# Patient Record
Sex: MALE | Race: OTHER | NOT HISPANIC OR LATINO | URBAN - METROPOLITAN AREA
[De-identification: names, ages, dates, MRNs, and addresses within clinical notes are randomized per-mention and may not be internally consistent; named-entity substitution may affect disease eponyms.]

---

## 2021-11-02 ENCOUNTER — EMERGENCY (EMERGENCY)
Facility: HOSPITAL | Age: 21
LOS: 1 days | Discharge: HOME | End: 2021-11-02
Attending: STUDENT IN AN ORGANIZED HEALTH CARE EDUCATION/TRAINING PROGRAM | Admitting: STUDENT IN AN ORGANIZED HEALTH CARE EDUCATION/TRAINING PROGRAM
Payer: OTHER MISCELLANEOUS

## 2021-11-02 VITALS
OXYGEN SATURATION: 99 % | DIASTOLIC BLOOD PRESSURE: 68 MMHG | SYSTOLIC BLOOD PRESSURE: 161 MMHG | HEART RATE: 62 BPM | RESPIRATION RATE: 16 BRPM | TEMPERATURE: 99 F

## 2021-11-02 PROCEDURE — 73130 X-RAY EXAM OF HAND: CPT | Mod: 26,RT

## 2021-11-02 PROCEDURE — 99284 EMERGENCY DEPT VISIT MOD MDM: CPT

## 2021-11-02 PROCEDURE — 73110 X-RAY EXAM OF WRIST: CPT | Mod: 26,RT

## 2021-11-02 RX ORDER — TETANUS TOXOID, REDUCED DIPHTHERIA TOXOID AND ACELLULAR PERTUSSIS VACCINE, ADSORBED 5; 2.5; 8; 8; 2.5 [IU]/.5ML; [IU]/.5ML; UG/.5ML; UG/.5ML; UG/.5ML
0.5 SUSPENSION INTRAMUSCULAR ONCE
Refills: 0 | Status: COMPLETED | OUTPATIENT
Start: 2021-11-02 | End: 2021-11-02

## 2021-11-02 RX ORDER — IBUPROFEN 200 MG
600 TABLET ORAL ONCE
Refills: 0 | Status: COMPLETED | OUTPATIENT
Start: 2021-11-02 | End: 2021-11-02

## 2021-11-02 RX ADMIN — TETANUS TOXOID, REDUCED DIPHTHERIA TOXOID AND ACELLULAR PERTUSSIS VACCINE, ADSORBED 0.5 MILLILITER(S): 5; 2.5; 8; 8; 2.5 SUSPENSION INTRAMUSCULAR at 20:23

## 2021-11-02 RX ADMIN — Medication 600 MILLIGRAM(S): at 20:23

## 2021-11-02 NOTE — ED PROVIDER NOTE - NSFOLLOWUPINSTRUCTIONS_ED_ALL_ED_FT
1) Please follow-up with Ortho/Hand at [facility] in the next 1-2 weeks. Please call [phone] today for an appointment.   2) If you have any worsening of symptoms or any other concerns please return to the ED immediately.  3) You may have been given a copy of your labs and/or imaging.  Please go over these with your primary care doctor.      Fractura de dedo    LO QUE NECESITA SABER:    Penny fractura del dedo de la mano es penny rotura en adore o más de los huesos en el dedo de la mano.    INSTRUCCIONES SOBRE EL STELLA HOSPITALARIA:    Regrese a la perry de emergencias si:  •El yeso o la férula se mojan, se dañan o se le caen.      •El yeso o la férula se sienten muy apretados.      •Usted tiene dolor intenso.      •El dedo lesionado se encuentra entumecido frío o pálido.      Llame a carter médico o especialista en starr si:  •Aun después del tratamiento, empeora carter dolor o inflamación.      •Usted tiene preguntas o inquietudes acerca de carter condición o cuidado.      Medicamentos:Es posible que usted necesite alguno de los siguientes:   •Los VALENCIA,nell el ibuprofeno, ayudan a disminuir la inflamación, el dolor y la fiebre. Hosea medicamento está disponible con o sin penny receta médica. Los VALENCIA pueden causar sangrado estomacal o problemas renales en ciertas personas. Si usted elizabeth un medicamento anticoagulante, siempre pregúntele a carter médico si los VALENCIA son seguros para usted. Siempre hannah la etiqueta de hosea medicamento y siga las instrucciones.      •Acetaminofénalivia el dolor y baja la fiebre. Está disponible sin receta médica. Pregunte la cantidad y la frecuencia con que debe tomarlos. Siga las indicaciones. Hannah las etiquetas de todos los demás medicamentos que esté usando para saber si también contienen acetaminofén, o pregunte a carter médico o farmacéutico. El acetaminofén puede causar daño en el hígado cuando no se elizabeth de forma correcta. No use más de 4 gramos (4000 miligramos) en total de acetaminofeno en un día.      •Puede administrarsepodrían administrarse. Pregunte al médico cómo debe [place] hosea medicamento de forma torres. Algunos medicamentos recetados para el dolor contienen acetaminofén. No tome otros medicamentos que contengan acetaminofén sin consultarlo con carter médico. Demasiado acetaminofeno puede causar daño al hígado. Los medicamentos recetados para el dolor podrían causar estreñimiento. Pregunte a carter médico nell prevenir o tratar estreñimiento.      •[place] sky medicamentos nell se le haya indicado.Consulte con carter médico si usted aleksander que carter medicamento no le está ayudando o si presenta efectos secundarios. Infórmele si es alérgico a cualquier medicamento. Mantenga penny lista actualizada de los medicamentos, las vitaminas y los productos herbales que elizabeth. Incluya los siguientes datos de los medicamentos: cantidad, frecuencia y motivo de administración. Traiga con usted la lista o los envases de las píldoras a sky citas de seguimiento. Lleve la lista de los medicamentos con usted en margarita de penny emergencia.      Cuidados personales:  •Use carter férula nell se le indique.No retire la férula sin la autorización del médico o especialista de mano.      •Aplique hieloen el dedo de 15 a 20 minutos cada hora o nell se le indique. Use penny compresa de hielo o ponga hielo triturado en penny bolsa de plástico. Cúbralo con penny toalla antes de aplicarlo sobre carter piel. El hielo ayuda a evitar daño al tejido y a disminuir la inflamación y el dolor.      •Eleveel dedo por encima del nivel del corazón con la mayor frecuencia posible. [place]away va a disminuir inflamación y el dolor. Coloque carter mano sobre almohadas o cobijas para mantenerlas elevadas cómodamente.             •Vaya a terapia física según indicaciones.Un fisioterapeuta le puede enseñar ejercicios para ayudarle a mejorar el movimiento y la fuerza, y para disminuir el dolor.      Acuda a penny consulta de control con carter médico o especialista en starr dentro de los 2 días:Anote sky preguntas para que se acuerde de hacerlas naresh sky visitas.

## 2021-11-02 NOTE — ED PROCEDURE NOTE - CPROC ED POST PROC CARE GUIDE1
follow up with hand surgery/Verbal/written post procedure instructions were given to patient/caregiver./Instructed patient/caregiver regarding signs and symptoms of infection./Elevate the injured extremity as instructed./Keep the cast/splint/dressing clean and dry.

## 2021-11-02 NOTE — ED PROVIDER NOTE - PHYSICAL EXAMINATION
GEN: no acute respiratory distress. nontoxic, speaking comfortably in full sentences, ambulating with steady gait.  HEENT: NCAT. face symmetrical. PERRL 4mm, EOMI, normal auditory canal b/l, normal TM b/l. no hemotympanum. nose midline and without discharge,  MMM, oropharynx wnl.  Neck: no JVD, trachea midline, no LAD  CV: RRR. +S1S2, no murmur. 2+ pulses in 4 extremities, cap refill <2 sec  Chest: CTA B/l. no wheezing, rales, rhonchi. no retractions. good air movement. no tenderness. no rash or ecchymosis  ABD: +BS, soft, non distended, non tender. No guarding/rebound. No lesions, ecchymosis, surgical scar  : no cva or suprapubic tenderness  MSK: No clubbing, cyanosis, edema. FROM of all extremities. no tenderness to palpation. No midline or paraspinal tenderness. no spinal step-offs.  Neuro: AAOX3.  CN 2-12 intact; Sensation intact, motor 5/5 throughout. finger-nose/heal-shin intact. no ataxia  SKIN: No erythema, lesions or rash

## 2021-11-02 NOTE — ED PROVIDER NOTE - CLINICAL SUMMARY MEDICAL DECISION MAKING FREE TEXT BOX
3rd right digit crush injury. small avulsion not amenable to primary repair. xr shows + fx. splint applied. given out-patient follow up information for hand @ [facility]. Return precautions were discussed with patient at bedside regarding signs of infection, pain, compartment syndrome as well as care for injury and patient expressed understanding. stable for dc.

## 2021-11-02 NOTE — ED ADULT TRIAGE NOTE - CHIEF COMPLAINT QUOTE
Pt AOX4 c/o injury to Right middle finger; heavy metal object fell on finger at work; pt presents with lac to finger and swelling, unable to move finger; other fingers OK, no injury to wrist or hand reported; No PMHx  Primarily Mongolian speaking; utilized  [facility] #[id number]

## 2021-11-02 NOTE — ED PROVIDER NOTE - PROGRESS NOTE DETAILS
+fracture . skin with sub-CM avulsion but firmly adherent. splinted will give out-patient follow up. Return precautions were discussed with patient at bedside and patient expressed understanding. stable for dc *language line  used throughout eval, splinting and reassessments*    +fracture . skin with sub-CM avulsion but firmly adherent. splinted will give out-patient follow up. Return precautions were discussed with patient at bedside and patient expressed understanding. stable for dc

## 2021-11-02 NOTE — ED PROVIDER NOTE - OBJECTIVE STATEMENT
right 3rd finger *language line  used throughout eval, splinting and reassessments*  20 yo M no past medical history presets several hours after a piece of metal at a construction site hit his right 3rd finger. pain localized to 3rd digit. does report, swelling, decreased sensation. has lac on palmar side of the mid phalanx which he reports washing off prior to eval. unknown last tetanus. denies other injury or pain elsewhere.  denies drugs or etoh use.  has not taking pain meds pta.

## 2021-11-02 NOTE — ED PROVIDER NOTE - PATIENT PORTAL LINK FT
You can access the FollowMyHealth Patient Portal offered by [facility] by registering at the following website: [email or web address]. By joining [facility]’s FollowMyHealth portal, you will also be able to view your health information using other applications (apps) compatible with our system.